# Patient Record
Sex: FEMALE | Race: WHITE | Employment: FULL TIME | URBAN - METROPOLITAN AREA
[De-identification: names, ages, dates, MRNs, and addresses within clinical notes are randomized per-mention and may not be internally consistent; named-entity substitution may affect disease eponyms.]

---

## 2023-07-01 ENCOUNTER — APPOINTMENT (OUTPATIENT)
Dept: CT IMAGING | Age: 48
End: 2023-07-01
Payer: OTHER GOVERNMENT

## 2023-07-01 ENCOUNTER — HOSPITAL ENCOUNTER (EMERGENCY)
Age: 48
Discharge: HOME | End: 2023-07-01
Attending: EMERGENCY MEDICINE
Payer: OTHER GOVERNMENT

## 2023-07-01 VITALS
HEIGHT: 63 IN | WEIGHT: 185 LBS | RESPIRATION RATE: 13 BRPM | TEMPERATURE: 98 F | DIASTOLIC BLOOD PRESSURE: 93 MMHG | HEART RATE: 85 BPM | BODY MASS INDEX: 32.78 KG/M2 | OXYGEN SATURATION: 98 % | SYSTOLIC BLOOD PRESSURE: 138 MMHG

## 2023-07-01 DIAGNOSIS — G43.809 OTHER MIGRAINE WITHOUT STATUS MIGRAINOSUS, NOT INTRACTABLE: Primary | ICD-10-CM

## 2023-07-01 DIAGNOSIS — R20.2 RIGHT HAND PARESTHESIA: ICD-10-CM

## 2023-07-01 LAB
EKG ATRIAL RATE: 117 BPM
EKG DIAGNOSIS: NORMAL
EKG P AXIS: 67 DEGREES
EKG P-R INTERVAL: 144 MS
EKG Q-T INTERVAL: 315 MS
EKG QRS DURATION: 119 MS
EKG QTC CALCULATION (BAZETT): 440 MS
EKG R AXIS: 79 DEGREES
EKG T AXIS: 15 DEGREES
EKG VENTRICULAR RATE: 117 BPM

## 2023-07-01 PROCEDURE — 93005 ELECTROCARDIOGRAM TRACING: CPT | Performed by: EMERGENCY MEDICINE

## 2023-07-01 PROCEDURE — 96375 TX/PRO/DX INJ NEW DRUG ADDON: CPT

## 2023-07-01 PROCEDURE — 93010 ELECTROCARDIOGRAM REPORT: CPT | Performed by: INTERNAL MEDICINE

## 2023-07-01 PROCEDURE — 99284 EMERGENCY DEPT VISIT MOD MDM: CPT

## 2023-07-01 PROCEDURE — 2580000003 HC RX 258: Performed by: EMERGENCY MEDICINE

## 2023-07-01 PROCEDURE — 96374 THER/PROPH/DIAG INJ IV PUSH: CPT

## 2023-07-01 PROCEDURE — 6360000002 HC RX W HCPCS: Performed by: EMERGENCY MEDICINE

## 2023-07-01 PROCEDURE — 6370000000 HC RX 637 (ALT 250 FOR IP): Performed by: EMERGENCY MEDICINE

## 2023-07-01 RX ORDER — KETOROLAC TROMETHAMINE 30 MG/ML
30 INJECTION, SOLUTION INTRAMUSCULAR; INTRAVENOUS
Status: COMPLETED | OUTPATIENT
Start: 2023-07-01 | End: 2023-07-01

## 2023-07-01 RX ORDER — BUTALBITAL, ACETAMINOPHEN AND CAFFEINE 50; 325; 40 MG/1; MG/1; MG/1
2 TABLET ORAL
Status: COMPLETED | OUTPATIENT
Start: 2023-07-01 | End: 2023-07-01

## 2023-07-01 RX ORDER — 0.9 % SODIUM CHLORIDE 0.9 %
100 INTRAVENOUS SOLUTION INTRAVENOUS
Status: DISCONTINUED | OUTPATIENT
Start: 2023-07-01 | End: 2023-07-01 | Stop reason: HOSPADM

## 2023-07-01 RX ORDER — DULOXETIN HYDROCHLORIDE 30 MG/1
CAPSULE, DELAYED RELEASE ORAL
COMMUNITY
Start: 2020-09-10

## 2023-07-01 RX ORDER — 0.9 % SODIUM CHLORIDE 0.9 %
1000 INTRAVENOUS SOLUTION INTRAVENOUS ONCE
Status: COMPLETED | OUTPATIENT
Start: 2023-07-01 | End: 2023-07-01

## 2023-07-01 RX ORDER — PROCHLORPERAZINE 25 MG
25 SUPPOSITORY, RECTAL RECTAL EVERY 12 HOURS PRN
Qty: 12 SUPPOSITORY | Refills: 1 | Status: SHIPPED | OUTPATIENT
Start: 2023-07-01

## 2023-07-01 RX ORDER — LORAZEPAM 2 MG/1
TABLET ORAL
COMMUNITY
Start: 2021-03-17

## 2023-07-01 RX ORDER — PROCHLORPERAZINE EDISYLATE 5 MG/ML
10 INJECTION INTRAMUSCULAR; INTRAVENOUS
Status: COMPLETED | OUTPATIENT
Start: 2023-07-01 | End: 2023-07-01

## 2023-07-01 RX ORDER — SUMATRIPTAN 50 MG/1
50 TABLET, FILM COATED ORAL
Qty: 8 TABLET | Refills: 0 | Status: SHIPPED | OUTPATIENT
Start: 2023-07-01 | End: 2023-07-01

## 2023-07-01 RX ORDER — SODIUM CHLORIDE 0.9 % (FLUSH) 0.9 %
10 SYRINGE (ML) INJECTION
Status: DISCONTINUED | OUTPATIENT
Start: 2023-07-01 | End: 2023-07-01 | Stop reason: HOSPADM

## 2023-07-01 RX ORDER — LOSARTAN POTASSIUM 25 MG/1
TABLET ORAL
COMMUNITY
Start: 2021-02-01

## 2023-07-01 RX ORDER — DIPHENHYDRAMINE HYDROCHLORIDE 50 MG/ML
12.5 INJECTION INTRAMUSCULAR; INTRAVENOUS
Status: COMPLETED | OUTPATIENT
Start: 2023-07-01 | End: 2023-07-01

## 2023-07-01 RX ORDER — DULOXETIN HYDROCHLORIDE 60 MG/1
CAPSULE, DELAYED RELEASE ORAL
COMMUNITY
Start: 2021-01-23

## 2023-07-01 RX ORDER — BUTALBITAL, ACETAMINOPHEN AND CAFFEINE 300; 40; 50 MG/1; MG/1; MG/1
2 CAPSULE ORAL EVERY 6 HOURS PRN
Qty: 20 CAPSULE | Refills: 0 | Status: SHIPPED | OUTPATIENT
Start: 2023-07-01

## 2023-07-01 RX ORDER — PROPRANOLOL HYDROCHLORIDE 20 MG/1
TABLET ORAL
COMMUNITY
Start: 2020-09-11

## 2023-07-01 RX ADMIN — PROCHLORPERAZINE EDISYLATE 10 MG: 5 INJECTION INTRAMUSCULAR; INTRAVENOUS at 11:13

## 2023-07-01 RX ADMIN — KETOROLAC TROMETHAMINE 30 MG: 30 INJECTION, SOLUTION INTRAMUSCULAR; INTRAVENOUS at 11:10

## 2023-07-01 RX ADMIN — SODIUM CHLORIDE 1000 ML: 9 INJECTION, SOLUTION INTRAVENOUS at 11:10

## 2023-07-01 RX ADMIN — BUTALBITAL, ACETAMINOPHEN, AND CAFFEINE 2 TABLET: 50; 325; 40 TABLET ORAL at 11:10

## 2023-07-01 RX ADMIN — DIPHENHYDRAMINE HYDROCHLORIDE 12.5 MG: 50 INJECTION, SOLUTION INTRAMUSCULAR; INTRAVENOUS at 11:12

## 2023-07-01 ASSESSMENT — PAIN SCALES - GENERAL: PAINLEVEL_OUTOF10: 8

## 2023-07-01 ASSESSMENT — PAIN DESCRIPTION - DESCRIPTORS: DESCRIPTORS: BURNING;STABBING

## 2023-07-01 ASSESSMENT — PAIN DESCRIPTION - LOCATION: LOCATION: HEAD

## 2023-07-01 ASSESSMENT — PAIN - FUNCTIONAL ASSESSMENT: PAIN_FUNCTIONAL_ASSESSMENT: 0-10

## 2023-07-15 ASSESSMENT — ENCOUNTER SYMPTOMS
EYE DISCHARGE: 0
ABDOMINAL PAIN: 0
FACIAL SWELLING: 0
SHORTNESS OF BREATH: 0
COUGH: 0
BACK PAIN: 0
RHINORRHEA: 0
COLOR CHANGE: 0
EYE REDNESS: 0
VOMITING: 0
PHOTOPHOBIA: 1
NAUSEA: 1

## 2023-10-19 ENCOUNTER — OFFICE VISIT (OUTPATIENT)
Dept: INTERNAL MEDICINE CLINIC | Facility: CLINIC | Age: 48
End: 2023-10-19
Payer: OTHER GOVERNMENT

## 2023-10-19 VITALS
OXYGEN SATURATION: 98 % | HEART RATE: 111 BPM | WEIGHT: 195.8 LBS | SYSTOLIC BLOOD PRESSURE: 158 MMHG | BODY MASS INDEX: 34.69 KG/M2 | HEIGHT: 63 IN | DIASTOLIC BLOOD PRESSURE: 100 MMHG

## 2023-10-19 DIAGNOSIS — M15.9 OSTEOARTHRITIS OF MULTIPLE JOINTS, UNSPECIFIED OSTEOARTHRITIS TYPE: ICD-10-CM

## 2023-10-19 DIAGNOSIS — Z86.79 H/O PAROXYSMAL SUPRAVENTRICULAR TACHYCARDIA: ICD-10-CM

## 2023-10-19 DIAGNOSIS — M25.50 PAIN, JOINT, MULTIPLE SITES: ICD-10-CM

## 2023-10-19 DIAGNOSIS — E78.2 MIXED HYPERLIPIDEMIA: Primary | ICD-10-CM

## 2023-10-19 DIAGNOSIS — N95.1 MENOPAUSAL HOT FLUSHES: ICD-10-CM

## 2023-10-19 DIAGNOSIS — Z86.79 H/O MITRAL VALVE PROLAPSE: ICD-10-CM

## 2023-10-19 DIAGNOSIS — I10 PRIMARY HYPERTENSION: ICD-10-CM

## 2023-10-19 DIAGNOSIS — Z23 ENCOUNTER FOR VACCINATION: ICD-10-CM

## 2023-10-19 DIAGNOSIS — E66.9 OBESITY (BMI 30-39.9): ICD-10-CM

## 2023-10-19 DIAGNOSIS — Z90.710 S/P HYSTERECTOMY: ICD-10-CM

## 2023-10-19 DIAGNOSIS — F32.A DEPRESSIVE DISORDER: ICD-10-CM

## 2023-10-19 PROBLEM — F41.0 PANIC ATTACK DUE TO POST TRAUMATIC STRESS DISORDER (PTSD): Status: ACTIVE | Noted: 2023-10-19

## 2023-10-19 PROBLEM — F41.9 ANXIETY DISORDER: Status: ACTIVE | Noted: 2023-10-19

## 2023-10-19 PROBLEM — Z87.39 HISTORY OF INFLAMMATION OF SACROILIAC JOINT: Status: ACTIVE | Noted: 2023-10-19

## 2023-10-19 PROBLEM — K21.9 GERD (GASTROESOPHAGEAL REFLUX DISEASE): Status: ACTIVE | Noted: 2023-10-19

## 2023-10-19 PROBLEM — F90.9 ADHD (ATTENTION DEFICIT HYPERACTIVITY DISORDER): Status: ACTIVE | Noted: 2023-10-19

## 2023-10-19 PROBLEM — M19.90 OSTEOARTHRITIS: Status: ACTIVE | Noted: 2023-10-19

## 2023-10-19 PROBLEM — G43.909 MIGRAINES: Status: ACTIVE | Noted: 2023-10-19

## 2023-10-19 PROBLEM — E78.5 HYPERLIPIDEMIA: Status: ACTIVE | Noted: 2023-10-19

## 2023-10-19 PROBLEM — Z87.448 HISTORY OF PROLAPSE OF BLADDER: Status: ACTIVE | Noted: 2023-10-19

## 2023-10-19 PROBLEM — R76.8 POSITIVE ANA (ANTINUCLEAR ANTIBODY): Status: ACTIVE | Noted: 2023-10-19

## 2023-10-19 PROBLEM — F43.10 PANIC ATTACK DUE TO POST TRAUMATIC STRESS DISORDER (PTSD): Status: ACTIVE | Noted: 2023-10-19

## 2023-10-19 PROBLEM — K58.9 IRRITABLE BOWEL SYNDROME: Status: ACTIVE | Noted: 2023-10-19

## 2023-10-19 PROCEDURE — 90674 CCIIV4 VAC NO PRSV 0.5 ML IM: CPT | Performed by: INTERNAL MEDICINE

## 2023-10-19 PROCEDURE — 3077F SYST BP >= 140 MM HG: CPT | Performed by: INTERNAL MEDICINE

## 2023-10-19 PROCEDURE — 99204 OFFICE O/P NEW MOD 45 MIN: CPT | Performed by: INTERNAL MEDICINE

## 2023-10-19 PROCEDURE — 90471 IMMUNIZATION ADMIN: CPT | Performed by: INTERNAL MEDICINE

## 2023-10-19 PROCEDURE — 3080F DIAST BP >= 90 MM HG: CPT | Performed by: INTERNAL MEDICINE

## 2023-10-19 RX ORDER — VALSARTAN 80 MG/1
80 TABLET ORAL DAILY
Qty: 90 TABLET | Refills: 1 | Status: SHIPPED | OUTPATIENT
Start: 2023-10-19

## 2023-10-19 SDOH — HEALTH STABILITY: PHYSICAL HEALTH: ON AVERAGE, HOW MANY MINUTES DO YOU ENGAGE IN EXERCISE AT THIS LEVEL?: 30 MIN

## 2023-10-19 SDOH — ECONOMIC STABILITY: FOOD INSECURITY: WITHIN THE PAST 12 MONTHS, THE FOOD YOU BOUGHT JUST DIDN'T LAST AND YOU DIDN'T HAVE MONEY TO GET MORE.: NEVER TRUE

## 2023-10-19 SDOH — ECONOMIC STABILITY: FOOD INSECURITY: WITHIN THE PAST 12 MONTHS, YOU WORRIED THAT YOUR FOOD WOULD RUN OUT BEFORE YOU GOT MONEY TO BUY MORE.: NEVER TRUE

## 2023-10-19 SDOH — ECONOMIC STABILITY: INCOME INSECURITY: HOW HARD IS IT FOR YOU TO PAY FOR THE VERY BASICS LIKE FOOD, HOUSING, MEDICAL CARE, AND HEATING?: NOT HARD AT ALL

## 2023-10-19 SDOH — ECONOMIC STABILITY: HOUSING INSECURITY
IN THE LAST 12 MONTHS, WAS THERE A TIME WHEN YOU DID NOT HAVE A STEADY PLACE TO SLEEP OR SLEPT IN A SHELTER (INCLUDING NOW)?: NO

## 2023-10-19 SDOH — HEALTH STABILITY: PHYSICAL HEALTH: ON AVERAGE, HOW MANY DAYS PER WEEK DO YOU ENGAGE IN MODERATE TO STRENUOUS EXERCISE (LIKE A BRISK WALK)?: 3 DAYS

## 2023-10-19 ASSESSMENT — PATIENT HEALTH QUESTIONNAIRE - PHQ9
2. FEELING DOWN, DEPRESSED OR HOPELESS: 0
SUM OF ALL RESPONSES TO PHQ QUESTIONS 1-9: 0
1. LITTLE INTEREST OR PLEASURE IN DOING THINGS: 0
SUM OF ALL RESPONSES TO PHQ QUESTIONS 1-9: 0
SUM OF ALL RESPONSES TO PHQ9 QUESTIONS 1 & 2: 0

## 2023-10-19 ASSESSMENT — ENCOUNTER SYMPTOMS: ABDOMINAL DISTENTION: 1

## 2023-10-19 NOTE — PROGRESS NOTES
Chief Complaint   Patient presents with    Establish Care     ? Menopause (partial hysterectomy - has ovaries)  increased anxiety, hot flashes. Kathi Araujo is a 53 y.o. female who presents today for Establish Care (?Menopause (partial hysterectomy - has ovaries)  increased anxiety, hot flashes. )     She is here to [facility], lives with her , they have 2 sons, 24and 77-year-old, she has been  for more than 25 years, has multiple pets at home, works as a traveler nurse, currently working in [place] where she stay 3 to 4 days a week. She has not been seen by a steady primary care over the last years, she reports being an [employer]  wife, but her  recently retired,    She is currently seeing a psychiatrist in [place], Dr. Silvia Etienne who has been managing her depression, anxiety, posttraumatic stress disorder, ADHD, he has been prescribing her medications, she is planning to continue managing with him    She reports chronic pain syndrome, associated with osteoarthritis, and has been taking Cymbalta  She reports that pain in the right hip, fingers, and in general body aches,    History of insomnia, using lorazepam    Has a history of hypertension, was previously taking losartan, but she ran out of the medication has not been taking any medication lately,  She reports previous history of COVID, and during condition, she developed paroxysmal A-fib, but after that she has had few episodes of SVT, she was [facility] but she did not feel comfortable and has lost follow-up, she also reports history of mitral valve prolapse, but no records of recent echocardiogram is seen.     She would like to establish with GYN to discuss possibility of hormonal therapy, she is a status post hysterectomy    She also reports taking multiple over-the-counter medications and supplements, including magnesium and multivitamins      Wt Readings from

## 2023-11-14 NOTE — PROGRESS NOTES
[address] [facility]  [id number] [place] [address], [facility]  PHONE: [phone]      11/15/23    NAME:  Marce Blackwell  : 1975  MRN: [id number]         SUBJECTIVE:   Marce Blackwell is a 53 y.o. female seen for a consultation visit regarding the following:     Chief Complaint   Patient presents with    Hypertension    Hyperlipidemia            HPI:  Consultation is requested by Denisse Beebe MD for evaluation of Hypertension and Hyperlipidemia   . Consult establish care. Traveling nurse with intermittent primary care, improving since  retired from [employer]. During COVID was told she had episode afib, SVT and MVP and was to have an EP procedure but understandably was reticent at the time at the height of the pandemic. No records. Was off antihypertensives for a time, recently resumed by her new PCP. Also sent for her cholesterol    She had inappropriate sinus tachycardia after COVID and was told once in [facility] she was in afib at a cardiologist's office. She had a syncopal episode on a plane over a year ago, evaluation was negative. Not exercising. Palpitations and some shortness of breath she is quick to attribute to her anxiety. Happens a couple of times a week, may last several hours, will sometimes take an extra propranolol. (Is on cymbalta and lorazepam, sees psychiatrist regularly)    Around  - on statin therapy, developed elevated LFTs. She was told never to take statins Saw an endocrinologist for another reason who recommended PCSK9i therapy but her  in the [employer] at the time during a time of transition. [facility] Score = 5. She reports having had a normal hs-CRP and a recent coronary calcium score of 1. Was told as a young child she had  MVP but on an echo as an adult none. Currently working in [address] [place] [address] in [place], [place] and plans to take a break until after the new  when that contract expires next week.          PAST CARDIAC

## 2023-11-15 ENCOUNTER — INITIAL CONSULT (OUTPATIENT)
Age: 48
End: 2023-11-15
Payer: OTHER GOVERNMENT

## 2023-11-15 VITALS
BODY MASS INDEX: 35.22 KG/M2 | WEIGHT: 198.8 LBS | HEIGHT: 63 IN | DIASTOLIC BLOOD PRESSURE: 88 MMHG | OXYGEN SATURATION: 99 % | HEART RATE: 70 BPM | SYSTOLIC BLOOD PRESSURE: 128 MMHG

## 2023-11-15 DIAGNOSIS — I10 PRIMARY HYPERTENSION: ICD-10-CM

## 2023-11-15 DIAGNOSIS — I10 ESSENTIAL HYPERTENSION: ICD-10-CM

## 2023-11-15 DIAGNOSIS — R00.2 PALPITATIONS: Primary | ICD-10-CM

## 2023-11-15 DIAGNOSIS — E78.01 FAMILIAL HYPERCHOLESTEROLEMIA: ICD-10-CM

## 2023-11-15 PROCEDURE — 3079F DIAST BP 80-89 MM HG: CPT | Performed by: INTERNAL MEDICINE

## 2023-11-15 PROCEDURE — 99204 OFFICE O/P NEW MOD 45 MIN: CPT | Performed by: INTERNAL MEDICINE

## 2023-11-15 PROCEDURE — 3074F SYST BP LT 130 MM HG: CPT | Performed by: INTERNAL MEDICINE

## 2023-11-15 RX ORDER — PROPRANOLOL HYDROCHLORIDE 20 MG/1
20 TABLET ORAL 2 TIMES DAILY PRN
Qty: 180 TABLET | Refills: 3 | Status: SHIPPED | OUTPATIENT
Start: 2023-11-15

## 2023-11-15 RX ORDER — VALSARTAN 80 MG/1
80 TABLET ORAL DAILY
Qty: 90 TABLET | Refills: 1 | Status: SHIPPED | OUTPATIENT
Start: 2023-11-15

## 2023-11-15 RX ORDER — LISDEXAMFETAMINE DIMESYLATE CAPSULES 60 MG/1
CAPSULE ORAL
COMMUNITY

## 2023-11-15 ASSESSMENT — ENCOUNTER SYMPTOMS: SHORTNESS OF BREATH: 1

## 2023-12-22 ENCOUNTER — TELEPHONE (OUTPATIENT)
Age: 48
End: 2023-12-22

## 2023-12-22 NOTE — TELEPHONE ENCOUNTER
Called pt to let her know she got approved for Repatha and to please contact her pharmacy.  Left a message for pt to call us back.

## 2023-12-26 ENCOUNTER — CLINICAL DOCUMENTATION (OUTPATIENT)
Age: 48
End: 2023-12-26

## 2024-01-03 NOTE — TELEPHONE ENCOUNTER
Requested Prescriptions     Pending Prescriptions Disp Refills    Evolocumab 140 MG/ML SOAJ 2 Adjustable Dose Pre-filled Pen Syringe 11     Sig: Inject 140 mg into the skin every 14 days     RX verified

## 2024-03-26 ENCOUNTER — OFFICE VISIT (OUTPATIENT)
Dept: INTERNAL MEDICINE CLINIC | Facility: CLINIC | Age: 49
End: 2024-03-26
Payer: OTHER GOVERNMENT

## 2024-03-26 VITALS
SYSTOLIC BLOOD PRESSURE: 106 MMHG | HEART RATE: 96 BPM | HEIGHT: 63 IN | BODY MASS INDEX: 32.6 KG/M2 | DIASTOLIC BLOOD PRESSURE: 68 MMHG | WEIGHT: 184 LBS | OXYGEN SATURATION: 98 %

## 2024-03-26 DIAGNOSIS — M25.551 RIGHT HIP PAIN: Primary | ICD-10-CM

## 2024-03-26 DIAGNOSIS — M25.561 CHRONIC PAIN OF RIGHT KNEE: ICD-10-CM

## 2024-03-26 DIAGNOSIS — G89.29 CHRONIC PAIN OF RIGHT KNEE: ICD-10-CM

## 2024-03-26 PROCEDURE — 3074F SYST BP LT 130 MM HG: CPT | Performed by: INTERNAL MEDICINE

## 2024-03-26 PROCEDURE — 99214 OFFICE O/P EST MOD 30 MIN: CPT | Performed by: INTERNAL MEDICINE

## 2024-03-26 PROCEDURE — 3078F DIAST BP <80 MM HG: CPT | Performed by: INTERNAL MEDICINE

## 2024-03-26 RX ORDER — GABAPENTIN 100 MG/1
100 CAPSULE ORAL 2 TIMES DAILY
Qty: 60 CAPSULE | Refills: 1 | Status: SHIPPED | OUTPATIENT
Start: 2024-03-26 | End: 2024-09-22

## 2024-03-26 ASSESSMENT — ENCOUNTER SYMPTOMS
CHEST TIGHTNESS: 0
ABDOMINAL PAIN: 0
COUGH: 0
CONSTIPATION: 0
SHORTNESS OF BREATH: 0
ABDOMINAL DISTENTION: 0

## 2024-03-26 NOTE — PROGRESS NOTES
Chief Complaint   Patient presents with    Hip Pain     New worsening right knee, foot pain.          Rut Cohn is a 48 y.o. female who presents today for Hip Pain (New worsening right knee, foot pain.  )     She is here in company of her  with concerns about worsening of right hip pain, now associated with right knee pain, and also has been noticing right foot and toe pain associated with swelling, and redness,  She had inflammatory workup done few months ago, and was negative, uric acid was normal,  She has a chronic joint pains, she has been on multiple medications in the past, and has been seeing by multiple providers, she has trigger points injections, she even had a procedure more than 14 years ago for femoral acetabular repair.  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 Right hip pain  -     diclofenac (VOLTAREN) 50 MG EC tablet; Take 1 tablet by mouth 3 times daily (with meals), Disp-60 tablet, R-3Normal  -     gabapentin (NEURONTIN) 100 MG capsule; Take 1 capsule by mouth 2 times daily for 180 days. Intended supply: 90 days, Disp-60 capsule, R-1Normal  -     [facility]  2. Chronic pain of right knee  -     [facility]

## 2024-05-09 ENCOUNTER — OFFICE VISIT (OUTPATIENT)
Dept: ORTHOPEDIC SURGERY | Age: 49
End: 2024-05-09

## 2024-05-09 DIAGNOSIS — G89.29 CHRONIC RIGHT SI JOINT PAIN: ICD-10-CM

## 2024-05-09 DIAGNOSIS — M67.951 TENDINOPATHY OF RIGHT GLUTEUS MEDIUS: ICD-10-CM

## 2024-05-09 DIAGNOSIS — M25.551 PAIN OF RIGHT HIP JOINT: ICD-10-CM

## 2024-05-09 DIAGNOSIS — M79.671 RIGHT FOOT PAIN: ICD-10-CM

## 2024-05-09 DIAGNOSIS — M25.551 RIGHT HIP PAIN: Primary | ICD-10-CM

## 2024-05-09 DIAGNOSIS — M53.3 CHRONIC RIGHT SI JOINT PAIN: ICD-10-CM

## 2024-05-09 RX ORDER — METHYLPREDNISOLONE ACETATE 80 MG/ML
80 INJECTION, SUSPENSION INTRA-ARTICULAR; INTRALESIONAL; INTRAMUSCULAR; SOFT TISSUE ONCE
Status: COMPLETED | OUTPATIENT
Start: 2024-05-09 | End: 2024-05-09

## 2024-05-09 RX ADMIN — METHYLPREDNISOLONE ACETATE 80 MG: 80 INJECTION, SUSPENSION INTRA-ARTICULAR; INTRALESIONAL; INTRAMUSCULAR; SOFT TISSUE at 11:46

## 2024-05-09 NOTE — PROGRESS NOTES
Name: Rut Cohn  YOB: 1975  Gender: female  MRN: [id number]  Date of Encounter:  5/9/2024       CHIEF COMPLAINT:     Chief Complaint   Patient presents with    Hip Pain     Right        SUBJECTIVE/OBJECTIVE:      HPI:    Patient is a 48 y.o. pleasant female who presents today for a new evaluation of her right hip pain.     Mrs. Cohn has a hx of right CASSI with arthroscopic acetabuloplasty in 2012 in [place]. She had a hip injection prior to this with some relief.  She did very well for several years postoperatively, but has had progressing right hip pain now for over a year. She has groin and lateral hip pain, especially with movements such as hip flexion, stepping up/down, squatting, and staying in one place for a while. She denies any numbness but notes burning pain lateral to her hip to her knee. She can only take diclofenac 1x/day due to stomach issues, and the gabapentin she is Rx does not help her. She recently had a fall last week and feel as if her hip/leg is weak.      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     PAST HISTORY:   Past medical, surgical, family, social history and allergies reviewed by me.   Pertinent history:   Tobacco use:  reports that she has been smoking e-cigarettes. She has never used smokeless tobacco.  Diabetes: none  Anticoagulation: no      REVIEW OF SYSTEMS:   As noted in HPI.     PHYSICAL EXAMINATION:     Gen: Well-developed, no acute distress   HEENT: NC/AT, EOMI   Neck: Trachea midline, normal ROM   CV: Regular rhythm by palpation of distal pulse, normal capillary refill   Pulm: No respiratory distress, no stridor   Psychiatric: Well oriented, normal mood and affect.   Skin: No rashes, lesions or ulcers, normal temperature, turgor, and texture on uninvolved extremity.      ORTHO EXAM:    Right Hip:      Inspection: No deformity, No erythema  ROM: 100 flexion, 35 internal rotation, 50 external rotation  Tenderness: there is tenderness to the hip

## 2024-05-16 ENCOUNTER — OFFICE VISIT (OUTPATIENT)
Dept: ORTHOPEDIC SURGERY | Age: 49
End: 2024-05-16

## 2024-05-16 DIAGNOSIS — M17.11 PATELLOFEMORAL ARTHRITIS OF RIGHT KNEE: Primary | ICD-10-CM

## 2024-05-16 DIAGNOSIS — M25.561 RIGHT KNEE PAIN, UNSPECIFIED CHRONICITY: ICD-10-CM

## 2024-05-16 RX ORDER — METHYLPREDNISOLONE ACETATE 80 MG/ML
80 INJECTION, SUSPENSION INTRA-ARTICULAR; INTRALESIONAL; INTRAMUSCULAR; SOFT TISSUE ONCE
Status: COMPLETED | OUTPATIENT
Start: 2024-05-16 | End: 2024-05-16

## 2024-05-16 RX ADMIN — METHYLPREDNISOLONE ACETATE 80 MG: 80 INJECTION, SUSPENSION INTRA-ARTICULAR; INTRALESIONAL; INTRAMUSCULAR; SOFT TISSUE at 14:51

## 2024-05-16 NOTE — PROGRESS NOTES
Name: Rut Chon  YOB: 1975  Gender: female  MRN: [id number]  Date of Encounter:  5/16/2024       CHIEF COMPLAINT:     Chief Complaint   Patient presents with    Knee Pain     Right        SUBJECTIVE/OBJECTIVE:      HPI:    Rut Cohn is a 48 y.o. pleasant female established patient who presents today for a new evaluation of her right knee.    She has had chronic right anterior knee pain to the inferior patella and proximal patellar tendon. Pain is worse with stairs specifically going down, and standing from a chair. She has tried braces of a variety of types, therapy, orals and topicals without relief. She has never had a knee steroid injection.     She has a hx of right hip CASSI with arthroscopic acetabuloplasty in 2012 in [place]. We performed FA injection last week. This gave her great relief.       PAST HISTORY:   Past medical, surgical, family, social history and allergies reviewed by me. Unchanged from prior visit.    REVIEW OF SYSTEMS:   As noted in HPI.     PHYSICAL EXAMINATION:     Gen: Well-developed, no acute distress   HEENT: NC/AT, EOMI   Neck: Trachea midline, normal ROM   CV: Regular rhythm by palpation of distal pulse, normal capillary refill   Pulm: No respiratory distress, no stridor   Psychiatric: Well oriented, normal mood and affect.   Skin: No rashes, lesions or ulcers, normal temperature, turgor, and texture on uninvolved extremity.      ORTHO EXAM:     Right knee:     Inspection: No deformity, No erythema  Palpation:  No effusion; no crepitus, Patellar mobility normal  ROM: 140 flexion, 0 extension   Tenderness: tenderness to medial patellar facet, patellar tendon, lateral fat pad anteriorly   Provocative testing: (-) Jaime lateral, Jaime medial , Anterior drawer, Posterior drawer, (+) Patellar grind  Strength: Extensor mechanism intact  Sensation: intact to light touch   Capillary refill normal        DIAGNOSTIC IMAGING:     X-ray RIGHT knee 4 vw AP / lateral /

## 2024-05-20 ENCOUNTER — OFFICE VISIT (OUTPATIENT)
Dept: ORTHOPEDIC SURGERY | Age: 49
End: 2024-05-20
Payer: OTHER GOVERNMENT

## 2024-05-20 DIAGNOSIS — M20.21 HALLUX RIGIDUS OF RIGHT FOOT: ICD-10-CM

## 2024-05-20 DIAGNOSIS — M20.11 HALLUX VALGUS OF RIGHT FOOT: ICD-10-CM

## 2024-05-20 DIAGNOSIS — M79.671 RIGHT FOOT PAIN: Primary | ICD-10-CM

## 2024-05-20 PROCEDURE — 99214 OFFICE O/P EST MOD 30 MIN: CPT | Performed by: ORTHOPAEDIC SURGERY

## 2024-05-20 NOTE — PROGRESS NOTES
Name: Rut Cohn  YOB: 1975  Gender: female  MRN: [id number]    Summary:     Right grade 2 hallux rigidus with hallux valgus interphalangeus     CC: New Patient (Dr. Moore referral Right foot xrays obtained in office today)       HPI: Rut Cohn is a 48 y.o. female who presents with New Patient (Dr. Moore referral Right foot xrays obtained in office today)  .  This patient presents the office of longstanding history of worsening right first MTP joint pain.  She has pain is increasing with all of that has a day living and affect her activity living now.    History was obtained by Patient and her     ROS/Meds/PSH/PMH/FH/SH: I personally reviewed the patients standard intake form.  Below are the pertinents    Tobacco:  reports that she has been smoking e-cigarettes. She has never used smokeless tobacco.  Diabetes: None      Physical Examination:    Exam of the right lower extremity shows limited range of motion with pain in the extremes of motion but no pain in the mid major motion of the first MTP joint.  She has palpable pulses and intact sensation.  There are no lesser toe pain or deformity.    Imaging:   Interpretation of imaging  Right foot XR: AP, Lateral, Oblique views     ICD-10-CM    1. Right foot pain  M79.671 XR FOOT RIGHT (MIN 3 VIEWS)      2. Hallux rigidus of right foot  M20.21       3. Hallux valgus of right foot  M20.11          Report: AP, lateral, oblique x-ray of the right foot demonstrates hallux rigidus    Impression: Hallux rigidus   CHANTALE KEMP III, MD           Assessment:   Right hallux rigidus with hallux valgus interphalangeus    Treatment Plan:   4 This is a chronic illness/condition with exacerbation and progression  Treatment at this time: Elective major surgery with procedural risk factors  Studies ordered: NO XR needed @ Next Visit    Weight-bearing status: WBAT        Return to work/work restrictions: none  No medications given    Right First MTP cheilectomy

## 2024-08-20 RX ORDER — METHYLPREDNISOLONE ACETATE 80 MG/ML
80 INJECTION, SUSPENSION INTRA-ARTICULAR; INTRALESIONAL; INTRAMUSCULAR; SOFT TISSUE ONCE
Status: CANCELLED | OUTPATIENT
Start: 2024-08-20 | End: 2024-08-20

## 2024-08-20 NOTE — PROGRESS NOTES
Name: Rut Cohn  YOB: 1975  Gender: female  MRN: [id number]  Date of Encounter:  8/23/2024       CHIEF COMPLAINT:     Chief Complaint   Patient presents with    Injections     R Hip CSI        SUBJECTIVE/OBJECTIVE:      HPI:    Patient is a 49 y.o. pleasant female who presents today for an injection of the right hip.    Recall Hx:  She has had chronic right anterior knee pain to the inferior patella and proximal patellar tendon. Pain is worse with stairs specifically going down, and standing from a chair. She has tried braces of a variety of types, therapy, orals and topicals without relief. She has never had a knee steroid injection.      She has a hx of right hip CASSI with arthroscopic acetabuloplasty in 2012 in [place]. We performed FA injection 5/9/24. This gave her great relief.     5/16/24: Performed CSI right knee. Advised to continue formal therapy.     8/23/24: She did well s/p both hip and knee injection until a few weeks ago. She started having increased aching pain. She did have a fall, but does not think that worsened her pain. No new swelling.     Right hip exam:     There is tenderness to the hip flexor. There is some mild pain with FADIR maneuver. Negative EMILY. Negative logroll. Flexion, internal and external rotation are normal.     ASSESSMENT/PLAN:     Encounter Diagnoses   Name Primary?    Pain of right hip joint Yes    Patellofemoral arthritis of right knee     Tendinopathy of right gluteus medius         We discussed right hip joint injection today, risks and benefits of injection including pain with injection, bleeding, damage to surrounding structures, infection, elevation in blood glucose, steroid flare. They wished to proceed with injection today and this was performed.  Continue tylenol as needed for pain. Continue hip mobility exercises.     We will likely inject her knee in 2 weeks.     Orders Placed This Encounter    US ARTHR/ASP/INJ MAJOR JNT/BURSA RIGHT

## 2024-08-23 ENCOUNTER — OFFICE VISIT (OUTPATIENT)
Dept: ORTHOPEDIC SURGERY | Age: 49
End: 2024-08-23

## 2024-08-23 DIAGNOSIS — M67.951 TENDINOPATHY OF RIGHT GLUTEUS MEDIUS: ICD-10-CM

## 2024-08-23 DIAGNOSIS — M17.11 PATELLOFEMORAL ARTHRITIS OF RIGHT KNEE: ICD-10-CM

## 2024-08-23 DIAGNOSIS — M25.551 PAIN OF RIGHT HIP JOINT: Primary | ICD-10-CM

## 2024-08-23 RX ORDER — METHYLPREDNISOLONE ACETATE 80 MG/ML
80 INJECTION, SUSPENSION INTRA-ARTICULAR; INTRALESIONAL; INTRAMUSCULAR; SOFT TISSUE ONCE
Status: COMPLETED | OUTPATIENT
Start: 2024-08-23 | End: 2024-08-23

## 2024-08-23 RX ADMIN — METHYLPREDNISOLONE ACETATE 80 MG: 80 INJECTION, SUSPENSION INTRA-ARTICULAR; INTRALESIONAL; INTRAMUSCULAR; SOFT TISSUE at 11:23

## 2024-09-09 RX ORDER — METHYLPREDNISOLONE ACETATE 80 MG/ML
80 INJECTION, SUSPENSION INTRA-ARTICULAR; INTRALESIONAL; INTRAMUSCULAR; SOFT TISSUE ONCE
Status: CANCELLED | OUTPATIENT
Start: 2024-09-09 | End: 2024-09-09

## 2024-09-17 ENCOUNTER — OFFICE VISIT (OUTPATIENT)
Dept: ORTHOPEDIC SURGERY | Age: 49
End: 2024-09-17
Payer: OTHER GOVERNMENT

## 2024-09-17 DIAGNOSIS — M17.11 PATELLOFEMORAL ARTHRITIS OF RIGHT KNEE: Primary | ICD-10-CM

## 2024-09-17 PROCEDURE — 99213 OFFICE O/P EST LOW 20 MIN: CPT | Performed by: STUDENT IN AN ORGANIZED HEALTH CARE EDUCATION/TRAINING PROGRAM
